# Patient Record
Sex: MALE | Race: WHITE | NOT HISPANIC OR LATINO | Employment: OTHER | URBAN - METROPOLITAN AREA
[De-identification: names, ages, dates, MRNs, and addresses within clinical notes are randomized per-mention and may not be internally consistent; named-entity substitution may affect disease eponyms.]

---

## 2020-01-01 ENCOUNTER — HOSPITAL ENCOUNTER (EMERGENCY)
Facility: HOSPITAL | Age: 80
End: 2020-05-14
Payer: MEDICARE

## 2020-01-01 VITALS — WEIGHT: 154 LBS | TEMPERATURE: 96 F | HEIGHT: 63 IN | BODY MASS INDEX: 27.29 KG/M2

## 2020-01-01 DIAGNOSIS — Q24.9 CARDIAC ABNORMALITY: ICD-10-CM

## 2020-01-01 DIAGNOSIS — I46.9 CARDIOPULMONARY ARREST: Primary | ICD-10-CM

## 2020-01-01 LAB — SARS-COV-2 RDRP RESP QL NAA+PROBE: NEGATIVE

## 2020-01-01 PROCEDURE — 51702 INSERT TEMP BLADDER CATH: CPT | Mod: 59

## 2020-01-01 PROCEDURE — 96374 THER/PROPH/DIAG INJ IV PUSH: CPT

## 2020-01-01 PROCEDURE — 94002 VENT MGMT INPAT INIT DAY: CPT

## 2020-01-01 PROCEDURE — 99900035 HC TECH TIME PER 15 MIN (STAT)

## 2020-01-01 PROCEDURE — 92950 HEART/LUNG RESUSCITATION CPR: CPT

## 2020-01-01 PROCEDURE — 99284 EMERGENCY DEPT VISIT MOD MDM: CPT | Mod: 25

## 2020-01-01 PROCEDURE — 63600175 PHARM REV CODE 636 W HCPCS: Performed by: EMERGENCY MEDICINE

## 2020-01-01 PROCEDURE — U0002 COVID-19 LAB TEST NON-CDC: HCPCS

## 2020-01-01 PROCEDURE — 96375 TX/PRO/DX INJ NEW DRUG ADDON: CPT | Mod: 59

## 2020-01-01 PROCEDURE — 25000003 PHARM REV CODE 250: Performed by: EMERGENCY MEDICINE

## 2020-01-01 RX ORDER — SODIUM BICARBONATE 1 MEQ/ML
SYRINGE (ML) INTRAVENOUS CODE/TRAUMA/SEDATION MEDICATION
Status: COMPLETED | OUTPATIENT
Start: 2020-01-01 | End: 2020-01-01

## 2020-01-01 RX ORDER — EPINEPHRINE 0.1 MG/ML
INJECTION INTRAVENOUS CODE/TRAUMA/SEDATION MEDICATION
Status: COMPLETED | OUTPATIENT
Start: 2020-01-01 | End: 2020-01-01

## 2020-01-01 RX ADMIN — SODIUM BICARBONATE 50 MEQ: 84 INJECTION, SOLUTION INTRAVENOUS at 08:05

## 2020-01-01 RX ADMIN — EPINEPHRINE 1 MG: 0.1 INJECTION INTRACARDIAC; INTRAVENOUS at 08:05

## 2020-01-01 RX ADMIN — SODIUM CHLORIDE 1000 ML: 0.9 SOLUTION INTRAVENOUS at 08:05

## 2020-02-05 PROBLEM — E11.9 DM TYPE 2 (DIABETES MELLITUS, TYPE 2): Status: ACTIVE | Noted: 2020-01-01

## 2020-02-05 PROBLEM — I25.2 HISTORY OF MYOCARDIAL INFARCTION: Status: ACTIVE | Noted: 2020-01-01

## 2020-02-05 PROBLEM — I34.0 NON-RHEUMATIC MITRAL REGURGITATION: Status: ACTIVE | Noted: 2020-01-01

## 2020-02-05 PROBLEM — I25.5 ISCHEMIC CARDIOMYOPATHY: Status: ACTIVE | Noted: 2020-01-01

## 2020-02-05 PROBLEM — E78.5 HYPERLIPIDEMIA: Status: ACTIVE | Noted: 2020-01-01

## 2020-02-05 PROBLEM — I50.42 CHF (CONGESTIVE HEART FAILURE), NYHA CLASS II, CHRONIC, COMBINED: Status: ACTIVE | Noted: 2020-01-01

## 2020-05-14 NOTE — ED TRIAGE NOTES
80 y.o. male presents to ER   Chief Complaint   Patient presents with    Cardiac Arrest     post cardiac arrest s/p fall on toilet   . No acute distress noted.  CPR initiated in the field per [facility]. Patient intubated in field per EMT-P.  IO initiated.  CBG = 190s.  ROSC obtained after 25 minutes of ACLS.  4 doses of epi & 500mL NS bolus given in the field.  AMBU in progress.

## 2020-05-14 NOTE — ED NOTES
Post-mortem care complete per ED staff.    Son to bedside: Bc  home in [place] is  home decision.    Spoke with Madison Velazco with [facility]- attempting to contact family for donation discussion.

## 2020-05-14 NOTE — ED NOTES
0930: Family at bedside.  Family unsure of  home choice.  Awaiting son's arrival to make determination.  0955: [facility]  notified.  Body will be released per Loretta.  1004: [facility] contacted.  Screening in progress with Mora Latham.  Referral number: [id number].  Awaiting return call at approximately 1130.

## 2020-05-14 NOTE — ED NOTES
Patient's body handed over to Brandy from [facility].  Family notified by house supervisor.

## 2020-05-14 NOTE — ED PROVIDER NOTES
[facility]                                                  Chief Complaint  80 y.o. male with Cardiac Arrest (post cardiac arrest s/p fall on toilet)    History of Present Illness  Jesse Wolfe presents to the emergency room via EMS in cardiac arrest.  Patient was found down by his wife in the bathroom.  She reports that she heard a thud but was unable to reach her  because he had fallen against the bathroom door blocking her entrance.  She reports that he had been feeling tired and lacked appetite in recent days.  CPR had been initiated 25 min prior to arrival by EMS.  Prior to arrival patient received 500 cc of normal saline and 4 doses of epinephrine during ACLS.  Patient was intubated and had an IO in the right proximal tibia.  Patient had no palpable pulses on arrival.    Past Medical History:   Diagnosis Date    Anemia     Arthritis     Bilateral carotid artery disease     Cardiomyopathy     CHF (congestive heart failure)     CKD (chronic kidney disease) stage 3, GFR 30-59 ml/min     Coronary artery disease     Diabetes mellitus     Encounter for blood transfusion     HLD (hyperlipidemia)     Hypertension     Mitral regurgitation     Osteoporosis     Seizures     AS A CHILD    Thyroid disease     Tricuspid regurgitation      Past Surgical History:   Procedure Laterality Date    CARDIAC ANGIOGRAMS WITH STENTS      CARDIAC DEFIBRILLATOR PLACEMENT N/A 2/5/2020    Procedure: Insertion, ICD;  Surgeon: Jesse Hendricks MD;  Location: [facility] CATH;  Service: Cardiology;  Laterality: N/A;    CAROTID ENDARTERECTOMY Right     CATARACT SX Bilateral     COLONOSCOPY        Review of patient's allergies indicates:  No Known Allergies     Review of Systems and Physical Exam     Review of Systems         complaint:  Cardiac arrest      -- Constitution - no fever, no weight loss, no loss of consciousness  -- Eyes - no changes in vision, no redness, no swelling  -- Ear, Nose  - no  earache, denies congestion  -- Mouth,Throat - no sore throat, no toothache, normal voice, normal swallowing  -- Respiratory - denies cough and congestion, no shortness of breath, no wheezing  -- Cardiovascular - denies chest pain, no palpitations,   -- Gastrointestinal - denies abdominal pain, denies nausea, vomiting, and diarrhea  -- Genitourinary - no dysuria, no denies flank pain, no hematuria or frequency   -- Musculoskeletal - denies back pain, negative for myalgias and arthralgias   -- Neurological - no headache, no neurologic changes, no loss of bladder or bowel function no seizure like activity, no changes in hearing or vision  -- Skin - denies skin changes, no rash, no hives, no suspected skin infection    Vital Signs   rectal temperature is 96 °F (35.6 °C) (pended). His respiration is 0 (abnormal, pended).      Physical Exam  -- Nursing note and vitals reviewed  -- Constitutional:  Patient is non-responsive.  GCS 3  -- Head:  Patient has multiple abrasions and evidence of minor to moderate head trauma without obvious deformity.  No active bleeding  -- Eyes: Pupils are fixed and dilated without pupillary response to light  -- Nose: Nose grossly normal in appearance, nares grossly normal. No rhinorrhea.  -- Throat:  Patient is intubated.  -- Cardiac:  Patient is in PEA on arrival  -- Pulmonary:  Patient is intubated and on a respirator  -- Abdominal: Soft no distension  -- Neurological:  Patient is not responding to commands.  He has no pupillary response.  He has no appreciable motor or sensory response  -- Vascular:  Patient does not have central or peripheral pulses palpable  -- Skin: Warm and dry on arrival with no evidence of rigor mortis  -- Psychiatric:  Patient is unresponsive.    Emergency Room Course     Treatment Course, Evaluation, and Medical Decision Making  1.  Physical exam significant for evidence of a fall, multiple skin tears, head trauma, patient is intubated, patient has no  pulse  2.  ACLS initiated immediately on patient arriving via EMS.  Compressions continued.  On initial exam patient has no active source of bleeding.  He is in pulseless PEA.  Patient has a defibrillator without pacemaker.  We initially believed that we appreciated a weak pulse and gave the patient 1 dose of atropine.  No additional pulses were felt.  Patient was given an additional 3 rounds of epinephrine.  We continued ACLS for 27 min from 820-847.  In addition efforts which had been completed on the ambulance.  After compressions were ceased, absence of cardiac activity was confirmed by bedside ultrasound.  Patient's pupils are fixed and dilated.  Patient had no respiratory effort.  Vent discontinued  3.  Time of death 8:52 a.m.  4.  Family notified.  Present at bedside  5.  Patient was deemed COVID 19-postmortem.  6.  60 min critical care performed on this patient    Abnormal lab values  Labs Reviewed   SARS-COV-2 RNA AMPLIFICATION, QUAL       Medications Given  Medications   EPINEPHrine 0.1 mg/mL injection (1 mg Intravenous Given 20 0837)   sodium bicarbonate 8.4 % (1 mEq/mL) injection (50 mEq Intravenous Given 20 0830)   sodium chloride 0.9% bolus ( Intravenous Stopped 20 0850)         Diagnosis  -- cardiac arrest, death  Disposition and Plan  -- Disposition:   home--        Cindy Duarte MD  20 0937       Cindy Duarte MD  20 0938       Cindy Duarte MD  20 0941       Cindy Duarte MD  20 0924